# Patient Record
(demographics unavailable — no encounter records)

---

## 2025-02-05 NOTE — HISTORY OF PRESENT ILLNESS
[FreeTextEntry1] : The patient is a 74-year-old right-handed woman who was diagnosed with Parkinson's disease about 3 years ago, in 2017, with initial symptoms being tremors of both hands, worse on the right. The tremor affected her writing as well as ADL such as holding a glass. In fact, she has to carry glasses with 2 hands. It has gotten worse.  She was followed by another neurologist, and started on levodopa. She states this sometimes helps the tremor, and it has been gradually increased to 25/100, 3 tablets 4 times a day. She has never taking any other medications. The  has noted once or twice that after drinking alcohol she seemed to be developing agitated movements. Work-up has included an MRI of the brain, which reportedly was normal, as well as more recently of the C-spine.  1. Maria G scan 3/5/2021 was normal.  MRI of the brain reviewed unremarkable, mild atrophy and minimal white matter disease. Stopped LD after Maria G scan.  2. On metoprolol 50 mg am and 25 qhs and primidone 50 mg 1 tid. She reports tremors got worse since  Her voice is unchanged-- not reduced in volume and she has no real changes in her facial expression. Saw Lynn Austin, decided against surgical options for now,.  3. She has no drooling, but has developed mild trouble swallowing pills. She is able to turn over in bed. Her handwriting and all ADLs are affected by the tremor. She feels she shuffles more, but has no freezing of gait or falls. Tremors affects handwriting.  4. Her mood is depressed and much more anxious due to her 's recent hospitalization and being his sole caretaker (left hemiplegia after surgery). Added sertraline 25 mg qhs, added 50 mg in the morning. PMD added xanax at night 5. Her memory is forgetful . She has no history of acting out her dreams but rarely yells in her sleep. She has no constipation, orthostasis, or urinary issues. Alcohol does improve the tremor. Her maternal grandfather developed Parkinson's disease at age 56, but there is no other family history of Parkinson's disease or other tremors. 6. No recurrence of stomach cancer, getting new medication for nausea/emesis.

## 2025-02-05 NOTE — DISCUSSION/SUMMARY
[FreeTextEntry1] : In summary, the patient is a 74-year-old right-handed woman with an initialt dx of Parkinson's disease. The examination was significant for a bilateral postural and kinetic tremor, as well as a mildly parkinsonian gait but no other parkinsonism. Overall, I am unsure of the history and examination represents Parkinson's disease. She had no real bradykinesia or rigidity, but this might also be masked by the significant doses of levodopa she is taking. Of LD, no change and Maria G scan was negative  She does have signs of essential tremor, especially on the left hand, and the alcohol responsiveness of the tremor would be consistent with that. The tremor during spiral drawing is more consistent with ET.    1. Her worsening of tremors and also report of forgetfulness might be related to her worsening depression and stresses due to ongoing family situations.  2. Continue sertraline, increase to 100 mg daily 3. For ET, continue Primidone, increase to 100 mg tid and continue metoprolol 50 mg am and 25 mg qhs.   We discussed the above impression, plan and recommendations during the visit. Counseling represented more then 50% of the 30 minute visit time.

## 2025-02-05 NOTE — PHYSICAL EXAM
[General Appearance - Alert] : alert [Oriented To Time, Place, And Person] : oriented to person, place, and time [Person] : oriented to person [Place] : oriented to place [Time] : oriented to time [Naming Objects] : no difficulty naming common objects [Fluency] : fluency intact [Comprehension] : comprehension intact [Current Events] : adequate knowledge of current events [Cranial Nerves Optic (II)] : visual acuity intact bilaterally,  visual fields full to confrontation, pupils equal round and reactive to light [Cranial Nerves Oculomotor (III)] : extraocular motion intact [Cranial Nerves Trigeminal (V)] : facial sensation intact symmetrically [Cranial Nerves Facial (VII)] : face symmetrical [Cranial Nerves Vestibulocochlear (VIII)] : hearing was intact bilaterally [Cranial Nerves Glossopharyngeal (IX)] : tongue and palate midline [Cranial Nerves Accessory (XI - Cranial And Spinal)] : head turning and shoulder shrug symmetric [Cranial Nerves Hypoglossal (XII)] : there was no tongue deviation with protrusion [Motor Handedness Right-Handed] : the patient is right hand dominant [FreeTextEntry1] :  Blink rate was minimally reduced but facial expression was otherwise normal. Voice was normal. Extraocular movements were intact with normal saccades, smooth pursuit, and no square wave jerks seen. Tone was normal at neck and bilateral upper and lower extremities.   Rapid alternating movements were slowed but slightly bilaterally. Foot tap and toe tap were slowed bilaterally. She easily got up from the chair without using her arms.  Gait was wide-based and antalgic (hip) with shortened stride and she took 2-3 steps on turns. Pull test negative but took several steps.    No rest tremor. She had a bilateral postural tremor, which is worse on the left. She also had a bilateral kinetic tremor, worse on the left today. Spiral drawing showed a clear axis to the tremors, worse on left.